# Patient Record
Sex: FEMALE | Race: WHITE | NOT HISPANIC OR LATINO | Employment: PART TIME | URBAN - METROPOLITAN AREA
[De-identification: names, ages, dates, MRNs, and addresses within clinical notes are randomized per-mention and may not be internally consistent; named-entity substitution may affect disease eponyms.]

---

## 2024-11-01 ENCOUNTER — APPOINTMENT (OUTPATIENT)
Dept: OBGYN | Facility: CLINIC | Age: 49
End: 2024-11-01
Payer: COMMERCIAL

## 2024-11-01 ENCOUNTER — TELEPHONE (OUTPATIENT)
Dept: OBGYN | Facility: CLINIC | Age: 49
End: 2024-11-01

## 2024-11-01 NOTE — TELEPHONE ENCOUNTER
Called patients son (emergency contact) because phone # on chart is not in service, her son gave me a different #, I left a voicemail to have patient call back to re-schedule colpo consult appt due to it being scheduled with the wrong provider. HW

## 2024-11-14 ENCOUNTER — GYNECOLOGY VISIT (OUTPATIENT)
Dept: OBGYN | Facility: CLINIC | Age: 49
End: 2024-11-14
Payer: COMMERCIAL

## 2024-11-14 VITALS — WEIGHT: 233 LBS | SYSTOLIC BLOOD PRESSURE: 136 MMHG | DIASTOLIC BLOOD PRESSURE: 80 MMHG

## 2024-11-14 DIAGNOSIS — Z87.42 HISTORY OF IRREGULAR MENSTRUAL BLEEDING: ICD-10-CM

## 2024-11-14 DIAGNOSIS — R87.619 ABNORMAL CERVICAL PAPANICOLAOU SMEAR, UNSPECIFIED ABNORMAL PAP FINDING: ICD-10-CM

## 2024-11-14 PROCEDURE — 3075F SYST BP GE 130 - 139MM HG: CPT | Performed by: ADVANCED PRACTICE MIDWIFE

## 2024-11-14 PROCEDURE — 99202 OFFICE O/P NEW SF 15 MIN: CPT | Performed by: ADVANCED PRACTICE MIDWIFE

## 2024-11-14 PROCEDURE — 3079F DIAST BP 80-89 MM HG: CPT | Performed by: ADVANCED PRACTICE MIDWIFE

## 2024-11-14 NOTE — PROGRESS NOTES
Henna Vergara is a 49 y.o. female who presents for consultation        [facility] Comments: Pt presents for consultation.  Patient's last menstrual period was 10/27/2024 (exact date).. Patient was referred from [facility] to discuss abnormal pap results. She reports she has never had an abnormal pap. Has been with the same partner for the past 10 years. She estimates that she has had irregular bleeding constantly for the past 3-5 years if not more.   She reports that when she was at , she was noted to have positive chlamydia test. She was treated and her continuous bleeding has since stopped. It was previously recommended she have EMB due to the bleeding.     Review of Systems   Pertinent positives documented in HPI and all other systems reviewed & are negative      Past Medical History:   Diagnosis Date    Hypertension        Medications:   No current [facility]-ordered outpatient medications on file.     No current [facility]-ordered facility-administered medications on file.          Objective:   Vital measurements:  /80   Wt 233 lb   There is no height or weight on file to calculate BMI. (Goal BM I>18 <25)    Physical Exam   Nursing note and vitals reviewed.  Constitutional: She is oriented to person, place, and time. She appears well-developed and well-nourished. No distress.     Musculoskeletal: Normal range of motion. She exhibits no edema and no tenderness.     Skin: Skin is warm and dry. No rash noted. She is not diaphoretic. No erythema. No pallor.     Psychiatric: She has a normal mood and affect. Her behavior is normal. Judgment and thought content normal.         Assessment:     1. Abnormal cervical Papanicolaou smear, unspecified abnormal pap finding        2. History of irregular menstrual bleeding            Plan:   No records available but were requested. Per note that was sent, patient likely has ASCUS with positive HPV. We discussed in detail paps vs colposcopy. We reviewed the plan for  colposcopy with biopsies at this time. ECC would also be planned.   At current, since bleeding has stopped, we will monitor menses to check regularity. Patient is counseled about marbella menopause and common symptoms that might be associated with this. We reviewed that if she starts bleeding irregularly again, we will proceed with EMB. We also discussed that grief from loss of child could also have contributed to some of the irregular bleeding.   Follow up for colposcopy.

## 2024-11-14 NOTE — PROGRESS NOTES
Pt here to discuss abnormal PAP. NP.    Pt states she would like to discuss abnormal PAP.  Good# [phone]  LMP: 10/27/2024  Pharmacy confirmed